# Patient Record
Sex: MALE | URBAN - METROPOLITAN AREA
[De-identification: names, ages, dates, MRNs, and addresses within clinical notes are randomized per-mention and may not be internally consistent; named-entity substitution may affect disease eponyms.]

---

## 2021-09-15 ENCOUNTER — APPOINTMENT (RX ONLY)
Dept: URBAN - METROPOLITAN AREA CLINIC 45 | Facility: CLINIC | Age: 21
Setting detail: DERMATOLOGY
End: 2021-09-15

## 2021-09-15 DIAGNOSIS — L01.01 NON-BULLOUS IMPETIGO: ICD-10-CM

## 2021-09-15 PROCEDURE — ? COUNSELING

## 2021-09-15 PROCEDURE — ? TREATMENT REGIMEN

## 2021-09-15 PROCEDURE — 99203 OFFICE O/P NEW LOW 30 MIN: CPT

## 2021-09-15 PROCEDURE — ? PRESCRIPTION

## 2021-09-15 RX ORDER — SULFAMETHOXAZOLE AND TRIMETHOPRIM 800; 160 MG/1; MG/1
TABLET ORAL
Qty: 20 | Refills: 0 | Status: ERX | COMMUNITY
Start: 2021-09-15

## 2021-09-15 RX ADMIN — SULFAMETHOXAZOLE AND TRIMETHOPRIM: 800; 160 TABLET ORAL at 00:00

## 2021-09-15 NOTE — PROCEDURE: MIPS QUALITY
Quality 110: Preventive Care And Screening: Influenza Immunization: Influenza Immunization Administered during Influenza season
Quality 431: Preventive Care And Screening: Unhealthy Alcohol Use - Screening: Patient identified as an unhealthy alcohol user when screened for unhealthy alcohol use using a systematic screening method and received brief counseling
Quality 402: Tobacco Use And Help With Quitting Among Adolescents: Patient screened for tobacco and never smoked
Detail Level: Detailed

## 2021-09-15 NOTE — PROCEDURE: TREATMENT REGIMEN
Plan: Location: left chin \\nPrescribe: Bactrim  mg-160 mg tablet (Take 1 tablet by mouth twice a day for 2 weeks)\\nMupirocin 2 % topical ointment (Apply to affected area daily as needed for flare ups)****pt had medication by another provider\\nPharmacy: [facility]\\nOTC: CLN Body Washe\\n          Serica Scar Therapy\\n\\nNew pt\\nPhotos taken \\n\\nPt comes in today concerned of an irritation around his left chin\\nPt states at the beginning in July it started like if it was a pimple \\nIt got very angry and with a white head but never came to the surface \\nHe then visited doctor and was diagnosed with Herpes Simplex and was put on Valtrex \\nHe states it did not improve he then saw another provider and was prescribed Mupirocin\\nHe notices a scar now and is unknown if there is any viral or bacteria infection\\nHe comes today for a further evaluation and treatment options\\n\\nDiscussed with pt\\nToday there is honey yellow crust developing around the inflammation; this is most likely impetigo.\\nDiscussed with pt that this is a bacterial skin infection that often comes on irritated skin.\\nI will like to start him on the best possible antibiotic and I will prescribe pt Bactrim  mg-160 mg tablet (Take 1 tablet by mouth twice a day for 10 days)\\nDiscussed being gentle with the skin---no peroxide or etoh, no neosporin.\\nHe will also continue the Mupirocin 2 % topical ointment once weekly  (Apply to ears and inside nostrils area once weekly) to help fight infection.\\nI recommend for him to start on the CLN Body wash 2 to 3 times a week to help prevent staph infection\\nI am giving him a sample of Serica Scar Moisturizing Therapy gel for him to start using twice a day for 12 weeks to help scar repair\\Alma his follow up we can discuss further treatment for the scar\\Jhonatan was written down for pt and coupons and samples given\\nPt voiced understanding\\n\\nF/u 6 to 8 weeks
Detail Level: Zone

## 2021-10-27 ENCOUNTER — APPOINTMENT (RX ONLY)
Dept: URBAN - METROPOLITAN AREA CLINIC 45 | Facility: CLINIC | Age: 21
Setting detail: DERMATOLOGY
End: 2021-10-27

## 2021-10-27 DIAGNOSIS — L90.5 SCAR CONDITIONS AND FIBROSIS OF SKIN: ICD-10-CM

## 2021-10-27 DIAGNOSIS — L01.01 NON-BULLOUS IMPETIGO: ICD-10-CM

## 2021-10-27 PROCEDURE — 99213 OFFICE O/P EST LOW 20 MIN: CPT

## 2021-10-27 PROCEDURE — ? COUNSELING

## 2021-10-27 PROCEDURE — ? TREATMENT REGIMEN

## 2021-10-27 PROCEDURE — ? PRESCRIPTION

## 2021-10-27 RX ORDER — SULFAMETHOXAZOLE AND TRIMETHOPRIM 800; 160 MG/1; MG/1
TABLET ORAL
Qty: 20 | Refills: 0 | Status: CANCELLED
Stop reason: CLARIF

## 2021-10-27 ASSESSMENT — LOCATION ZONE DERM: LOCATION ZONE: LIP

## 2021-10-27 ASSESSMENT — LOCATION SIMPLE DESCRIPTION DERM: LOCATION SIMPLE: LEFT LIP

## 2021-10-27 ASSESSMENT — LOCATION DETAILED DESCRIPTION DERM: LOCATION DETAILED: LEFT LOWER CUTANEOUS LIP

## 2021-10-27 NOTE — PROCEDURE: TREATMENT REGIMEN
Plan: Location: left chin \\nPrescribe: Bactrim  mg-160 mg tablet (Take 1 tablet by mouth twice a day for 2 weeks)\\nMupirocin 2 % topical ointment (Apply to affected area daily as needed for flare ups)****pt had medication by another provider\\nPharmacy: [facility]\\nOTC: CLN Body Washe\\n          Serica Scar Therapy\\n\\nNew pt\\nPhotos taken \\n\\nPt comes in today concerned of an irritation around his left chin\\nPt states at the beginning in July it started like if it was a pimple \\nIt got very angry and with a white head but never came to the surface \\nHe then visited doctor and was diagnosed with Herpes Simplex and was put on Valtrex \\nHe states it did not improve he then saw another provider and was prescribed Mupirocin\\nHe notices a scar now and is unknown if there is any viral or bacteria infection\\nHe comes today for a further evaluation and treatment options\\n\\nDiscussed with pt\\nToday there is honey yellow crust developing around the inflammation; this is most likely impetigo.\\nDiscussed with pt that this is a bacterial skin infection that often comes on irritated skin.\\nI will like to start him on the best possible antibiotic and I will prescribe pt Bactrim  mg-160 mg tablet (Take 1 tablet by mouth twice a day for 10 days)\\nDiscussed being gentle with the skin---no peroxide or etoh, no neosporin.\\nHe will also continue the Mupirocin 2 % topical ointment once weekly  (Apply to ears and inside nostrils area once weekly) to help fight infection.\\nI recommend for him to start on the CLN Body wash 2 to 3 times a week to help prevent staph infection\\nI am giving him a sample of Serica Scar Moisturizing Therapy gel for him to start using twice a day for 12 weeks to help scar repair\\Alma his follow up we can discuss further treatment for the scar\\Jhonatan was written down for pt and coupons and samples given\\nPt voiced understanding\\n\\nF/u 6 to 8 weeks
Detail Level: Zone
Plan: Location: Left chin \\nTreatment: Future appointment with exc\\n\\nFollow up\\nLOV 9/15/21\\nPhotos take \\n\\nPt comes in today concerned of an inflamed scar\\nPer had a pimple months ago that was a nodule cystic growth that was infected and erythematous \\nHe states now the scar is raised with irritation and itchy \\n\\nDiscussed with pt:\\nEvaluating with pt and comparing photos the infection has resolved with the course of Bactrim \\nDiscussed with pt that scar is hypertrophic and can be treated \\nPt states it is even more bothersome to him with the mask \\nI had a lengthy discussion with the pt about what to expect during and after excision \\nPt will discuss with parents his concerns and my treatment plan and he will call to schedule excision \\n\\nPRN

## 2023-11-01 ENCOUNTER — APPOINTMENT (RX ONLY)
Dept: URBAN - METROPOLITAN AREA CLINIC 77 | Facility: CLINIC | Age: 23
Setting detail: DERMATOLOGY
End: 2023-11-01

## 2023-11-01 DIAGNOSIS — L70.0 ACNE VULGARIS: ICD-10-CM

## 2023-11-01 DIAGNOSIS — B00.1 HERPESVIRAL VESICULAR DERMATITIS: ICD-10-CM

## 2023-11-01 PROCEDURE — 99213 OFFICE O/P EST LOW 20 MIN: CPT

## 2023-11-01 PROCEDURE — ? COUNSELING

## 2023-11-01 PROCEDURE — ? PRESCRIPTION

## 2023-11-01 PROCEDURE — ? EXTRACTIONS

## 2023-11-01 PROCEDURE — ? TREATMENT REGIMEN

## 2023-11-01 RX ORDER — SULFACETAMIDE SODIUM, SULFUR 98; 48 MG/G; MG/G
LIQUID TOPICAL
Qty: 285 | Refills: 4 | Status: ERX | COMMUNITY
Start: 2023-11-01

## 2023-11-01 RX ORDER — CLINDAMYCIN PHOSPHATE 1 %
KIT TOPICAL
Qty: 30 | Refills: 5 | Status: ERX | COMMUNITY
Start: 2023-11-01

## 2023-11-01 RX ORDER — VALACYCLOVIR HYDROCHLORIDE 1 G/1
TABLET, FILM COATED ORAL Q12 HOURS
Qty: 60 | Refills: 6 | Status: ERX | COMMUNITY
Start: 2023-11-01

## 2023-11-01 RX ORDER — MINOCYCLINE HYDROCHLORIDE 105 MG/1
TABLET, FILM COATED, EXTENDED RELEASE ORAL
Qty: 30 | Refills: 3 | Status: ERX | COMMUNITY
Start: 2023-11-01

## 2023-11-01 RX ADMIN — CLINDAMYCIN PHOSPHATE: KIT at 00:00

## 2023-11-01 RX ADMIN — MINOCYCLINE HYDROCHLORIDE: 105 TABLET, FILM COATED, EXTENDED RELEASE ORAL at 00:00

## 2023-11-01 RX ADMIN — VALACYCLOVIR HYDROCHLORIDE: 1 TABLET, FILM COATED ORAL at 00:00

## 2023-11-01 RX ADMIN — SULFACETAMIDE SODIUM, SULFUR: 98; 48 LIQUID TOPICAL at 00:00

## 2023-11-01 ASSESSMENT — LOCATION SIMPLE DESCRIPTION DERM: LOCATION SIMPLE: LEFT LIP

## 2023-11-01 ASSESSMENT — LOCATION ZONE DERM: LOCATION ZONE: LIP

## 2023-11-01 ASSESSMENT — LOCATION DETAILED DESCRIPTION DERM: LOCATION DETAILED: LEFT INFERIOR VERMILION LIP

## 2023-11-01 NOTE — PROCEDURE: EXTRACTIONS
Consent was obtained and risks were reviewed including but not limited to scarring, infection, bleeding, scabbing, incomplete removal, and allergy to anesthesia.
Render Number Of Lesions Treated: no
Detail Level: Detailed
Acne Type: Comedonal Lesions
Prep Text (Optional): Prior to removal the treatment areas were prepped in the usual fashion.
Post-Care Instructions: I reviewed with the patient in detail post-care instructions. Patient is to keep the treatment areas dry overnight, and then apply bacitracin twice daily until healed. Patient may apply hydrogen peroxide soaks to remove any crusting.
Extraction Method: 15 blade and q-tip

## 2023-11-01 NOTE — PROCEDURE: TREATMENT REGIMEN
Plan: Location: below lips\\nPrescribed: Valtrex 1gm 2 a day(every 12 hours as needed until lesion is gone)\\n\\n11/01/2023\\nPictures taken\\n\\nPt comes in today concerned of an irritation around his left chin\\nPt states the lesion appeared 6 weeks ago and went to an urgent care where he was prescribed Valacyclovir 1 tab a day for 30 days\\nPt mentions at the beginning he was taking two tablets a day to see if it would resolve and when it didn’t after a few days, he started taking 1 tablet a day\\nAdditionally pt states he was applying OTC Abreva to the affected area and after 2 tubes, he stopped applying it\\nPt states he has not been picking at the lesion below his lip\\nOverall pt is here because he has not seen the lesion improved and would like more treatment options\\nPt is here for further evaluation and management \\n\\nDiscussed with pt that there is a core in the lesion today\\nInformed pt that the lesion may have been a cold sore at the beginning 6 weeks ago \\nDiscussed with pt that this is an infection of the herpes simplex virus, usually around the border of the lips.\\nDiscussed with pt that we will prescribe an oral antiviral medication to help treat this virus. \\nDiscussed with pt to take 2 tablet a day until lesion is gone.\\nDiscussed with pt to keep medication in medicine cabinet afterward, just in case another flare up occurs.\\nAdvised pt to start taking medication when first onset of tingling sensation occurs (prodromal period) for best results.\\n\\nF/u as needed.
Detail Level: Zone
Plan: Location: face and body\\nPrescription: Solodyn 105mg \\n                       Clindamycin wipes\\n                       Plexion sulfur wash\\nTreatment: extraction with 11 blade and q tips\\n\\n11/01/2023\\n\\nPt presents with mild acne on his face\\nDiscussed with patient that this is an immune mediated condition triggered with stress, lack of sleep, and also has a major genetic component\\n\\nDiscussed with pt that we will prescribe Solodyn 105 mg and Plexion Wash (2-5 minutes) daily to help reduce inflammation.\\nToday I performed an extraction on the left inferior vermillion lip with an 11 blade and 2 q tips\\n\\n\\nWill f/u in 5 weeks

## 2023-11-01 NOTE — PROCEDURE: MIPS QUALITY
normal...
Quality 110: Preventive Care And Screening: Influenza Immunization: Influenza Immunization Administered during Influenza season
Quality 431: Preventive Care And Screening: Unhealthy Alcohol Use - Screening: Patient identified as an unhealthy alcohol user when screened for unhealthy alcohol use using a systematic screening method and received brief counseling
Quality 402: Tobacco Use And Help With Quitting Among Adolescents: Patient screened for tobacco and never smoked
Detail Level: Detailed

## 2023-11-01 NOTE — PROCEDURE: COUNSELING
Detail Level: Detailed
Tazorac Counseling:  Patient advised that medication is irritating and drying.  Patient may need to apply sparingly and wash off after an hour before eventually leaving it on overnight.  The patient verbalized understanding of the proper use and possible adverse effects of tazorac.  All of the patient's questions and concerns were addressed.
High Dose Vitamin A Counseling: Side effects reviewed, pt to contact office should one occur.
Tetracycline Pregnancy And Lactation Text: This medication is Pregnancy Category D and not consider safe during pregnancy. It is also excreted in breast milk.
Detail Level: Zone
Topical Retinoid counseling:  Patient advised to apply a pea-sized amount only at bedtime and wait 30 minutes after washing their face before applying.  If too drying, patient may add a non-comedogenic moisturizer. The patient verbalized understanding of the proper use and possible adverse effects of retinoids.  All of the patient's questions and concerns were addressed.
Benzoyl Peroxide Pregnancy And Lactation Text: This medication is Pregnancy Category C. It is unknown if benzoyl peroxide is excreted in breast milk.
Tazorac Pregnancy And Lactation Text: This medication is not safe during pregnancy. It is unknown if this medication is excreted in breast milk.
Sarecycline Counseling: Patient advised regarding possible photosensitivity and discoloration of the teeth, skin, lips, tongue and gums.  Patient instructed to avoid sunlight, if possible.  When exposed to sunlight, patients should wear protective clothing, sunglasses, and sunscreen.  The patient was instructed to call the office immediately if the following severe adverse effects occur:  hearing changes, easy bruising/bleeding, severe headache, or vision changes.  The patient verbalized understanding of the proper use and possible adverse effects of sarecycline.  All of the patient's questions and concerns were addressed.
Azithromycin Counseling:  I discussed with the patient the risks of azithromycin including but not limited to GI upset, allergic reaction, drug rash, diarrhea, and yeast infections.
Topical Clindamycin Pregnancy And Lactation Text: This medication is Pregnancy Category B and is considered safe during pregnancy. It is unknown if it is excreted in breast milk.
Topical Sulfur Applications Counseling: Topical Sulfur Counseling: Patient counseled that this medication may cause skin irritation or allergic reactions.  In the event of skin irritation, the patient was advised to reduce the amount of the drug applied or use it less frequently.   The patient verbalized understanding of the proper use and possible adverse effects of topical sulfur application.  All of the patient's questions and concerns were addressed.
Topical Sulfur Applications Pregnancy And Lactation Text: This medication is Pregnancy Category C and has an unknown safety profile during pregnancy. It is unknown if this topical medication is excreted in breast milk.
Erythromycin Counseling:  I discussed with the patient the risks of erythromycin including but not limited to GI upset, allergic reaction, drug rash, diarrhea, increase in liver enzymes, and yeast infections.
Isotretinoin Pregnancy And Lactation Text: This medication is Pregnancy Category X and is considered extremely dangerous during pregnancy. It is unknown if it is excreted in breast milk.
Topical Retinoid Pregnancy And Lactation Text: This medication is Pregnancy Category C. It is unknown if this medication is excreted in breast milk.
Bactrim Counseling:  I discussed with the patient the risks of sulfa antibiotics including but not limited to GI upset, allergic reaction, drug rash, diarrhea, dizziness, photosensitivity, and yeast infections.  Rarely, more serious reactions can occur including but not limited to aplastic anemia, agranulocytosis, methemoglobinemia, blood dyscrasias, liver or kidney failure, lung infiltrates or desquamative/blistering drug rashes.
High Dose Vitamin A Pregnancy And Lactation Text: High dose vitamin A therapy is contraindicated during pregnancy and breast feeding.
Tetracycline Counseling: Patient counseled regarding possible photosensitivity and increased risk for sunburn.  Patient instructed to avoid sunlight, if possible.  When exposed to sunlight, patients should wear protective clothing, sunglasses, and sunscreen.  The patient was instructed to call the office immediately if the following severe adverse effects occur:  hearing changes, easy bruising/bleeding, severe headache, or vision changes.  The patient verbalized understanding of the proper use and possible adverse effects of tetracycline.  All of the patient's questions and concerns were addressed. Patient understands to avoid pregnancy while on therapy due to potential birth defects.
Bactrim Pregnancy And Lactation Text: This medication is Pregnancy Category D and is known to cause fetal risk.  It is also excreted in breast milk.
Dapsone Pregnancy And Lactation Text: This medication is Pregnancy Category C and is not considered safe during pregnancy or breast feeding.
Azithromycin Pregnancy And Lactation Text: This medication is considered safe during pregnancy and is also secreted in breast milk.
Minocycline Counseling: Patient advised regarding possible photosensitivity and discoloration of the teeth, skin, lips, tongue and gums.  Patient instructed to avoid sunlight, if possible.  When exposed to sunlight, patients should wear protective clothing, sunglasses, and sunscreen.  The patient was instructed to call the office immediately if the following severe adverse effects occur:  hearing changes, easy bruising/bleeding, severe headache, or vision changes.  The patient verbalized understanding of the proper use and possible adverse effects of minocycline.  All of the patient's questions and concerns were addressed.
Isotretinoin Counseling: Patient should get monthly blood tests, not donate blood, not drive at night if vision affected, not share medication, and not undergo elective surgery for 6 months after tx completed. Side effects reviewed, pt to contact office should one occur.
Use Enhanced Medication Counseling?: No
Erythromycin Pregnancy And Lactation Text: This medication is Pregnancy Category B and is considered safe during pregnancy. It is also excreted in breast milk.
Benzoyl Peroxide Counseling: Patient counseled that medicine may cause skin irritation and bleach clothing.  In the event of skin irritation, the patient was advised to reduce the amount of the drug applied or use it less frequently.   The patient verbalized understanding of the proper use and possible adverse effects of benzoyl peroxide.  All of the patient's questions and concerns were addressed.
Dapsone Counseling: I discussed with the patient the risks of dapsone including but not limited to hemolytic anemia, agranulocytosis, rashes, methemoglobinemia, kidney failure, peripheral neuropathy, headaches, GI upset, and liver toxicity.  Patients who start dapsone require monitoring including baseline LFTs and weekly CBCs for the first month, then every month thereafter.  The patient verbalized understanding of the proper use and possible adverse effects of dapsone.  All of the patient's questions and concerns were addressed.
Doxycycline Counseling:  Patient counseled regarding possible photosensitivity and increased risk for sunburn.  Patient instructed to avoid sunlight, if possible.  When exposed to sunlight, patients should wear protective clothing, sunglasses, and sunscreen.  The patient was instructed to call the office immediately if the following severe adverse effects occur:  hearing changes, easy bruising/bleeding, severe headache, or vision changes.  The patient verbalized understanding of the proper use and possible adverse effects of doxycycline.  All of the patient's questions and concerns were addressed.
Birth Control Pills Counseling: Birth Control Pill Counseling: I discussed with the patient the potential side effects of OCPs including but not limited to increased risk of stroke, heart attack, thrombophlebitis, deep venous thrombosis, hepatic adenomas, breast changes, GI upset, headaches, and depression.  The patient verbalized understanding of the proper use and possible adverse effects of OCPs. All of the patient's questions and concerns were addressed.
Topical Clindamycin Counseling: Patient counseled that this medication may cause skin irritation or allergic reactions.  In the event of skin irritation, the patient was advised to reduce the amount of the drug applied or use it less frequently.   The patient verbalized understanding of the proper use and possible adverse effects of clindamycin.  All of the patient's questions and concerns were addressed.
Spironolactone Pregnancy And Lactation Text: This medication can cause feminization of the male fetus and should be avoided during pregnancy. The active metabolite is also found in breast milk.
Birth Control Pills Pregnancy And Lactation Text: This medication should be avoided if pregnant and for the first 30 days post-partum.
Spironolactone Counseling: Patient advised regarding risks of diarrhea, abdominal pain, hyperkalemia, birth defects (for female patients), liver toxicity and renal toxicity. The patient may need blood work to monitor liver and kidney function and potassium levels while on therapy. The patient verbalized understanding of the proper use and possible adverse effects of spironolactone.  All of the patient's questions and concerns were addressed.
Doxycycline Pregnancy And Lactation Text: This medication is Pregnancy Category D and not consider safe during pregnancy. It is also excreted in breast milk but is considered safe for shorter treatment courses.